# Patient Record
Sex: MALE | Race: ASIAN | URBAN - METROPOLITAN AREA
[De-identification: names, ages, dates, MRNs, and addresses within clinical notes are randomized per-mention and may not be internally consistent; named-entity substitution may affect disease eponyms.]

---

## 2023-04-25 ENCOUNTER — OFFICE VISIT (OUTPATIENT)
Dept: URBAN - METROPOLITAN AREA CLINIC 60 | Facility: CLINIC | Age: 23
End: 2023-04-25
Payer: OTHER GOVERNMENT

## 2023-04-25 DIAGNOSIS — H25.89 OTHER AGE-RELATED CATARACT: Primary | ICD-10-CM

## 2023-04-25 PROCEDURE — 92004 COMPRE OPH EXAM NEW PT 1/>: CPT | Performed by: OPTOMETRIST

## 2023-04-25 ASSESSMENT — VISUAL ACUITY
OS: 20/20
OD: 20/20

## 2023-04-25 ASSESSMENT — INTRAOCULAR PRESSURE
OS: 17
OD: 18

## 2023-04-25 NOTE — IMPRESSION/PLAN
Impression: Other age-related cataract: H25.89; OS (vacuole) Plan: Patient educated regarding findings. No treatment currently recommended due to level of vision. Patient will monitor vision changes and contact us with any decrease in vision. New glasses rx given.